# Patient Record
Sex: MALE | Race: WHITE
[De-identification: names, ages, dates, MRNs, and addresses within clinical notes are randomized per-mention and may not be internally consistent; named-entity substitution may affect disease eponyms.]

---

## 2022-03-17 ENCOUNTER — DASHBOARD ENCOUNTERS (OUTPATIENT)
Age: 52
End: 2022-03-17

## 2022-03-17 ENCOUNTER — OFFICE VISIT (OUTPATIENT)
Dept: URBAN - METROPOLITAN AREA CLINIC 35 | Facility: CLINIC | Age: 52
End: 2022-03-17
Payer: COMMERCIAL

## 2022-03-17 VITALS
DIASTOLIC BLOOD PRESSURE: 86 MMHG | HEART RATE: 79 BPM | SYSTOLIC BLOOD PRESSURE: 128 MMHG | OXYGEN SATURATION: 98 % | WEIGHT: 201 LBS | BODY MASS INDEX: 28.14 KG/M2 | HEIGHT: 71 IN

## 2022-03-17 DIAGNOSIS — K59.1 FUNCTIONAL DIARRHEA: ICD-10-CM

## 2022-03-17 DIAGNOSIS — Z12.11 COLON CANCER SCREENING: ICD-10-CM

## 2022-03-17 PROCEDURE — 99204 OFFICE O/P NEW MOD 45 MIN: CPT | Performed by: PHYSICIAN ASSISTANT

## 2022-03-17 RX ORDER — SODIUM, POTASSIUM,MAG SULFATES 17.5-3.13G
177ML SOLUTION, RECONSTITUTED, ORAL ORAL
Qty: 1 | Refills: 0 | OUTPATIENT
Start: 2022-03-17 | End: 2022-03-19

## 2022-03-17 NOTE — HPI-DIARRHEA
Patient admits to diarrhea only if he eats "crappy", the wrong foods.  He states it's not an issue for him and quickly resolves.

## 2022-03-17 NOTE — HPI-COLONOSCOPY SCREENING
Patient presents today for a colorectal cancer screening. He currently admits 1 bowel movement per day with normal and formed stools. Patient denies seeing any blood or mucous in the stool. Patient denies melena. Patient denies heartburn. Patient states this is his first colonoscopy. There is no history of bleeding disorders or respiratory diseases. Patient has never had any previous complications with anesthesia. There is no history of spinal cord injuries.

## 2022-05-19 ENCOUNTER — OFFICE VISIT (OUTPATIENT)
Dept: URBAN - METROPOLITAN AREA SURGERY CENTER 8 | Facility: SURGERY CENTER | Age: 52
End: 2022-05-19

## 2022-06-01 ENCOUNTER — OFFICE VISIT (OUTPATIENT)
Dept: URBAN - METROPOLITAN AREA CLINIC 35 | Facility: CLINIC | Age: 52
End: 2022-06-01

## 2022-06-20 ENCOUNTER — CLAIMS CREATED FROM THE CLAIM WINDOW (OUTPATIENT)
Dept: URBAN - METROPOLITAN AREA CLINIC 4 | Facility: CLINIC | Age: 52
End: 2022-06-20
Payer: COMMERCIAL

## 2022-06-20 ENCOUNTER — OFFICE VISIT (OUTPATIENT)
Dept: URBAN - METROPOLITAN AREA SURGERY CENTER 8 | Facility: SURGERY CENTER | Age: 52
End: 2022-06-20
Payer: COMMERCIAL

## 2022-06-20 DIAGNOSIS — D12.5 ADENOMA OF SIGMOID COLON: ICD-10-CM

## 2022-06-20 DIAGNOSIS — D12.5 BENIGN NEOPLASM OF SIGMOID COLON: ICD-10-CM

## 2022-06-20 DIAGNOSIS — D12.4 BENIGN NEOPLASM OF DESCENDING COLON: ICD-10-CM

## 2022-06-20 DIAGNOSIS — Z12.11 COLON CANCER SCREENING: ICD-10-CM

## 2022-06-20 DIAGNOSIS — K63.89 OTHER SPECIFIED DISEASES OF INTESTINE: ICD-10-CM

## 2022-06-20 DIAGNOSIS — D12.4 ADENOMA OF DESCENDING COLON: ICD-10-CM

## 2022-06-20 DIAGNOSIS — K63.89 BACTERIAL OVERGROWTH SYNDROME: ICD-10-CM

## 2022-06-20 PROCEDURE — 45385 COLONOSCOPY W/LESION REMOVAL: CPT | Performed by: INTERNAL MEDICINE

## 2022-06-20 PROCEDURE — 88305 TISSUE EXAM BY PATHOLOGIST: CPT | Performed by: PATHOLOGY

## 2022-06-20 PROCEDURE — G8907 PT DOC NO EVENTS ON DISCHARG: HCPCS | Performed by: INTERNAL MEDICINE

## 2022-06-20 PROCEDURE — 45380 COLONOSCOPY AND BIOPSY: CPT | Performed by: INTERNAL MEDICINE

## 2022-06-28 ENCOUNTER — WEB ENCOUNTER (OUTPATIENT)
Dept: URBAN - METROPOLITAN AREA CLINIC 35 | Facility: CLINIC | Age: 52
End: 2022-06-28

## 2022-07-06 ENCOUNTER — OFFICE VISIT (OUTPATIENT)
Dept: URBAN - METROPOLITAN AREA CLINIC 35 | Facility: CLINIC | Age: 52
End: 2022-07-06

## 2022-07-06 NOTE — HPI-COLONOSCOPY FOLLOWUP
2/18/2021 New patient 14-year-old previously healthy male here for abdominal pain, indigestion.  Abdominal pain is chronic, started back in September 2020.  Location generalized, character is crampy/achy, at its worst it is a 4 out of 10 in severity.  Patient denies any alleviating or exacerbating factors and says that he is in pain almost all the time every day.  He also has indigestion after eating he feels like food does not sit well but denies any reflux, heartburn, excessive burping, flatulence.  Tried Carafate which is difficult for patient to swallow, it did not help.  Currently on Pepcid, which does not help. Bowel movements patient has 1-2 bowel movements per week stools are hard BSS 1-2, plus straining with stools, no blood in the stool, no diarrhea, no rectal bleeding. 51 Year old male patient presents today for a follow up from his colonoscopy. He admits/denies any complications after his procedure. He currently reports -- bowel movements per --, with/without strain. His stools are -- and --. He admits/denies any mucus, melena or blood in stools. Admits/Denies any pruritus ani or rectal pain.

## 2023-06-01 NOTE — HPI-DIARRHEA
Patient admits or denies any improvements in diarrhea. Patient states??.  Last Visit (3/17/22) Patient admits to diarrhea only if he eats "crappy", the wrong foods.  He states it's not an issue for him and quickly resolves.
Patient expressed no known problems or needs